# Patient Record
Sex: MALE | Race: WHITE
[De-identification: names, ages, dates, MRNs, and addresses within clinical notes are randomized per-mention and may not be internally consistent; named-entity substitution may affect disease eponyms.]

---

## 2018-10-26 LAB — PLATELET COUNT, AUTOMATED: 276 K/UL (ref 150–450)

## 2018-10-26 NOTE — EKG
FACILITY: [facility] 

 

PATIENT NAME: KAYLAN DARDEN

: [id number]

MR: [id number]

V: [id number]

EXAM DATE: 

ORDERING PHYSICIAN: ARI FERGUSON

TECHNOLOGIST: 

 

Test Reason : pre-op

Blood Pressure : ***/*** mmHG

Vent. Rate : 061 BPM     Atrial Rate : 061 BPM

   P-R Int : 186 ms          QRS Dur : 100 ms

    QT Int : 416 ms       P-R-T Axes : 051 054 060 degrees

   QTc Int : 418 ms

 

Sinus rhythm

Otherwise normal ECG

No previous ECGs available

Confirmed by ZENA ARELLANO ([id number]) on 10/26/2018 8:41:31 PM

 

Referred By:             Confirmed By:ZENA ARELLANO

## 2018-10-31 NOTE — HISTORY AND PHYSICAL
DATE OF ADMISSION:  November 1, 2018





CHIEF COMPLAINT  

Right inguinal hernia.



HISTORY OF PRESENT ILLNESS 

Patient is a 78-year-old white male with an approximately two to three month 

history of right groin discomfort with burning and bulge.  He states the 

discomfort increases with standing and movement and he has no problems at rest. 

He denies chronic cough, obstructive urinary symptoms or constipation.  Physical

exam in the office revealed a reducible right inguinal hernia.  He is now being 

brought to the operating room for planned repair.  The significant risks and 

benefits were discussed including bleeding, infection, damage to adjacent 

structures, recurrence of hernia and chronic numbness and/or discomfort at the 

surgical site.



PAST MEDICAL HISTORY 

* Noninsulin dependent diabetes.

* BPH.

* History of kidney stones.

* History of elevated PSA.



PAST SURGICAL HISTORY 

* Ankle fracture in 1984.

* Rotator cuff in 1981.

* Right lower pole extracorporeal shock wave lithotripsy in 2012.

* Transrectal ultrasound of prostate in 2008.

* Open simple retropubic prostatectomy in January 2017.



ALLERGIES 

AVODART, FINASTERIDE, LEVAQUIN and STRAWBERRIES.



CURRENT MEDICATIONS 

None. 



FAMILY HISTORY 

Noncontributory.



REVIEW OF SYSTEMS 

Patient denies chest pain, bleeding disorder, productive cough, chronic 

headaches, dyspnea on exertion or cardiac history.



PHYSICAL EXAMINATION 

GENERAL:  Patient is a well-developed, well-nourished white male in no acute 

distress. 

HEENT:  Normocephalic/atraumatic.  

CHEST:  Clear to auscultation bilaterally. 

CV:  Regular rate and rhythm.  

ABDOMEN: Soft, nontender.  No masses palpated.

:  Normal appearing penis.  He has a right bulging inguinal hernia.

RECTAL:  Deferred.

EXTREMITIES:  No cyanosis, clubbing or edema.

NEUROLOGICAL:  Nonfocal. 



ASSESSMENT 

Right inguinal hernia.



PLAN 

Right inguinal hernia repair.



[facility]

## 2018-11-01 ENCOUNTER — HOSPITAL ENCOUNTER (OUTPATIENT)
Dept: HOSPITAL 89 - OR | Age: 78
End: 2018-11-01
Attending: UROLOGY
Payer: MEDICARE

## 2018-11-01 VITALS
HEIGHT: 70 IN | BODY MASS INDEX: 25.91 KG/M2 | WEIGHT: 181 LBS | HEIGHT: 70 IN | BODY MASS INDEX: 25.91 KG/M2 | WEIGHT: 181 LBS

## 2018-11-01 VITALS — DIASTOLIC BLOOD PRESSURE: 82 MMHG | SYSTOLIC BLOOD PRESSURE: 143 MMHG

## 2018-11-01 VITALS — DIASTOLIC BLOOD PRESSURE: 70 MMHG | SYSTOLIC BLOOD PRESSURE: 136 MMHG

## 2018-11-01 VITALS — SYSTOLIC BLOOD PRESSURE: 133 MMHG | DIASTOLIC BLOOD PRESSURE: 74 MMHG

## 2018-11-01 VITALS — SYSTOLIC BLOOD PRESSURE: 135 MMHG | DIASTOLIC BLOOD PRESSURE: 79 MMHG

## 2018-11-01 VITALS — DIASTOLIC BLOOD PRESSURE: 89 MMHG | SYSTOLIC BLOOD PRESSURE: 156 MMHG

## 2018-11-01 VITALS — DIASTOLIC BLOOD PRESSURE: 72 MMHG | SYSTOLIC BLOOD PRESSURE: 143 MMHG

## 2018-11-01 DIAGNOSIS — D17.6: ICD-10-CM

## 2018-11-01 DIAGNOSIS — K40.90: Primary | ICD-10-CM

## 2018-11-01 DIAGNOSIS — E11.9: ICD-10-CM

## 2018-11-01 DIAGNOSIS — N40.0: ICD-10-CM

## 2018-11-01 PROCEDURE — 82247 BILIRUBIN TOTAL: CPT

## 2018-11-01 PROCEDURE — 87088 URINE BACTERIA CULTURE: CPT

## 2018-11-01 PROCEDURE — 84132 ASSAY OF SERUM POTASSIUM: CPT

## 2018-11-01 PROCEDURE — 82435 ASSAY OF BLOOD CHLORIDE: CPT

## 2018-11-01 PROCEDURE — 93005 ELECTROCARDIOGRAM TRACING: CPT

## 2018-11-01 PROCEDURE — 84075 ASSAY ALKALINE PHOSPHATASE: CPT

## 2018-11-01 PROCEDURE — 36415 COLL VENOUS BLD VENIPUNCTURE: CPT

## 2018-11-01 PROCEDURE — 84295 ASSAY OF SERUM SODIUM: CPT

## 2018-11-01 PROCEDURE — 84450 TRANSFERASE (AST) (SGOT): CPT

## 2018-11-01 PROCEDURE — 84155 ASSAY OF PROTEIN SERUM: CPT

## 2018-11-01 PROCEDURE — 82040 ASSAY OF SERUM ALBUMIN: CPT

## 2018-11-01 PROCEDURE — 82374 ASSAY BLOOD CARBON DIOXIDE: CPT

## 2018-11-01 PROCEDURE — 84520 ASSAY OF UREA NITROGEN: CPT

## 2018-11-01 PROCEDURE — 82310 ASSAY OF CALCIUM: CPT

## 2018-11-01 PROCEDURE — 82947 ASSAY GLUCOSE BLOOD QUANT: CPT

## 2018-11-01 PROCEDURE — 82565 ASSAY OF CREATININE: CPT

## 2018-11-01 PROCEDURE — 88302 TISSUE EXAM BY PATHOLOGIST: CPT

## 2018-11-01 PROCEDURE — 49505 PRP I/HERN INIT REDUC >5 YR: CPT

## 2018-11-01 PROCEDURE — 84460 ALANINE AMINO (ALT) (SGPT): CPT

## 2018-11-01 PROCEDURE — 85025 COMPLETE CBC W/AUTO DIFF WBC: CPT

## 2018-11-01 PROCEDURE — 88304 TISSUE EXAM BY PATHOLOGIST: CPT

## 2018-11-01 PROCEDURE — 81001 URINALYSIS AUTO W/SCOPE: CPT

## 2018-11-01 PROCEDURE — 55520 REMOVAL OF SPERM CORD LESION: CPT

## 2018-11-01 NOTE — OPERATIVE REPORT 1
EVENT DATE:  November 1, 2018 

SURGEON:  Dwight Lozoya MD 

ANESTHESIOLOGIST:  Jeremias Valiente MD 

ANESTHESIA:  General anesthetic.





PREOPERATIVE DIAGNOSIS  

Right inguinal hernia.



POSTOPERATIVE DIAGNOSES 

1.  Cord lipoma.

2.  Right indirect inguinal hernia.

3.  Right direct inguinal hernia.  



PROCEDURES PERFORMED 

1.  Removal of right cord lipoma.

2.  Right indirect hernia repair by high ligation of hernia sac.  

3.  Right direct hernia repair with Alyssia-type mesh repair.  



ESTIMATED BLOOD LOSS 

10 mL 



INTRAVENOUS FLUIDS 

Crystalloids.



DRAINS

None.



COMPLICATIONS 

None.



PATHOLOGY 

Hernia sac and cord lipoma for primary analysis.  



CONDITION

Patient taken to the recovery room awake, in stable condition.



STATEMENT OF MEDICAL NECESSITY 

Patient is a 78-year-old white male who approximately two to three months ago 

began experiencing right lower quadrant burning and discomfort after physical 

activity.  Physical exam revealed a reducible right inguinal hernia.  He is now 

being brought to the operating room for repair.  Specific risks and benefits 

were explained including bleeding, infection, damage to adjacent structures 

including vasa, vessels, and nerves, recurrence of hernia, and need for a 

secondary procedure.  Also, neurapraxia with chronic pain and/or paresthesias 

were also explained.



DESCRIPTION OF OPERATION PERFORMED  

Patient was brought to the operating room.  After general anesthetic was 

obtained, he was placed supine on the operating room table.  He was prepped and 

draped sterilely.  A 5 cm right lower quadrant skin incision was made just 

anterior to the external ring, proceeding out superolaterally along the lines of

Langerhans with a 15 blade knife.  This was taken down through the subcutaneous 

tissues with electrocautery until the aponeurosis of the external oblique was 

encountered.  This was incised parallel with its fibers to expose the inguinal 

contents.  The ilioinguinal nerve was identified and retracted to the side.  The

cord was isolated in the pubic tubercle with a Penrose drain.  Inspection 

revealed extreme attenuation of the hernia floor.  He was also noted to have a 

moderate cord lipoma and an indirect component as well with the hernia sac down 

to almost the external ring area along the cord.  At this point, the cord lipoma

was dissected off the superolateral aspect of the cord where it was taken down 

to the internal ring.  It was doubly cross-clamped, sharply removed, and sent 

for analysis.  The lipoma was then doubly tied with free silk ties around a 

right angle clamp.  Following this, the indirect hernia sac was identified 

superomedially and was dissected off the cord structures as well, including the 

cremasteric fibers to just inside the internal ring.  The sac was then opened.  

There were no intra-abdominal contents or anomalies inside the cord sac.  It was

twisted upon itself down into the internal ring.  It was doubly cross-clamped 

with a right angle, sharply removed, and sent for analysis.  The end was 

fulgurated.  I made a stitch tie with #1 silk.  It was done on the more distal 

clamp and a free tie placed on the more proximal clamp.  After release of the 

last right-angled clamp, the hernia sac stump retracted nicely into the internal

ring.  At this point, a piece of mesh was prepared on the back table after 

measuring the defect and cut to the appropriate size.  It was secured in place 

with running 2-0 PDS stitch starting at the pubic tubercle and proceeding along 

the inguinal ligament on the inferolateral aspect and along the transversalis 

fascia along the medial superior aspect.  An incision was made on the sac at its

lateral edge to encompass the cord as it exited the internal ring.  The mesh was

reapproximated out laterally to this area with some interrupted 2-0 PDS.  At 

this point, the wound was irrigated with copious amounts of antibiotic solution.

 The patient was next given a local with 0.2% ropivacaine along the cord, 

fascia, and skin edges.  At this point, the Penrose drain was removed around the

cord.  It was placed back in its normal anatomical position along with the 

ilioinguinal nerve.  The aponeurosis of the external oblique was closed with a 

running 2-0 Vicryl.  Subcuticular tissues were reapproximated with interrupted 

3-0 chromic.  The skin was closed with a running 4-0 Vicryl stitch in the 

subcutaneous areas.  Skin adhesive was placed along the skin edge.  A sterile 

fluff dressing was applied at the conclusion of the case.  The patient was 

awakened in the operating room and taken to the recovery area in stable 

condition.  



PLAN

Will allow the patient to be discharged home today on Norco alternating with 

Motrin for the next 36 hours.  He will also be given a prescription for Colace 

and instructed to apply a scrotal support and ice packs for the next 36 hours.  

Will plan to see him in the [facility] in four to six weeks for followup.  

MARGOTH

## 2018-11-30 ENCOUNTER — HOSPITAL ENCOUNTER (OUTPATIENT)
Dept: HOSPITAL 89 - ZZSENDIN | Age: 78
End: 2018-11-30
Attending: UROLOGY
Payer: MEDICARE

## 2018-11-30 VITALS — BODY MASS INDEX: 25.97 KG/M2

## 2018-11-30 DIAGNOSIS — Z48.01: Primary | ICD-10-CM

## 2018-11-30 PROCEDURE — 87070 CULTURE OTHR SPECIMN AEROBIC: CPT

## 2018-11-30 PROCEDURE — 87073 CULTURE BACTERIA ANAEROBIC: CPT
